# Patient Record
Sex: MALE
[De-identification: names, ages, dates, MRNs, and addresses within clinical notes are randomized per-mention and may not be internally consistent; named-entity substitution may affect disease eponyms.]

---

## 2020-05-28 ENCOUNTER — HOSPITAL ENCOUNTER (INPATIENT)
Dept: HOSPITAL 92 - ERS | Age: 80
LOS: 2 days | Discharge: NURSING, REHAB OR LONG-TERM CARE | DRG: 494 | End: 2020-05-30
Attending: SURGERY | Admitting: SURGERY
Payer: MEDICARE

## 2020-05-28 VITALS — BODY MASS INDEX: 32.8 KG/M2

## 2020-05-28 DIAGNOSIS — N40.0: ICD-10-CM

## 2020-05-28 DIAGNOSIS — Z90.49: ICD-10-CM
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
## 2020-05-28 DIAGNOSIS — I10: ICD-10-CM

## 2020-05-28 DIAGNOSIS — E11.9: ICD-10-CM

## 2020-05-28 DIAGNOSIS — Z79.899: ICD-10-CM
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
## 2020-05-28 DIAGNOSIS — S82.841A: Primary | ICD-10-CM

## 2020-05-28 LAB
ALBUMIN SERPL BCG-MCNC: 4.2 G/DL (ref 3.4–4.8)
ALP SERPL-CCNC: 49 U/L (ref 40–110)
ALT SERPL W P-5'-P-CCNC: 31 U/L (ref 8–55)
ANION GAP SERPL CALC-SCNC: 13 MMOL/L (ref 10–20)
APTT PPP: 31.8 SEC (ref 22.9–36.1)
AST SERPL-CCNC: 23 U/L (ref 5–34)
BASOPHILS # BLD AUTO: 0 THOU/UL (ref 0–0.2)
BASOPHILS NFR BLD AUTO: 0.2 % (ref 0–1)
BILIRUB SERPL-MCNC: 0.6 MG/DL (ref 0.2–1.2)
BUN SERPL-MCNC: 18 MG/DL (ref 8.4–25.7)
CALCIUM SERPL-MCNC: 9 MG/DL (ref 7.8–10.44)
CHLORIDE SERPL-SCNC: 106 MMOL/L (ref 98–107)
CO2 SERPL-SCNC: 22 MMOL/L (ref 23–31)
CREAT CL PREDICTED SERPL C-G-VRATE: 0 ML/MIN (ref 70–130)
EOSINOPHIL # BLD AUTO: 0.2 THOU/UL (ref 0–0.7)
EOSINOPHIL NFR BLD AUTO: 1.7 % (ref 0–10)
GLOBULIN SER CALC-MCNC: 2.4 G/DL (ref 2.4–3.5)
GLUCOSE SERPL-MCNC: 126 MG/DL (ref 83–110)
HGB BLD-MCNC: 14.6 G/DL (ref 14–18)
INR PPP: 1
LYMPHOCYTES # BLD: 1.4 THOU/UL (ref 1.2–3.4)
LYMPHOCYTES NFR BLD AUTO: 12.4 % (ref 21–51)
MCH RBC QN AUTO: 29.8 PG (ref 27–31)
MCV RBC AUTO: 90.2 FL (ref 78–98)
MONOCYTES # BLD AUTO: 0.8 THOU/UL (ref 0.11–0.59)
MONOCYTES NFR BLD AUTO: 6.8 % (ref 0–10)
NEUTROPHILS # BLD AUTO: 9.2 THOU/UL (ref 1.4–6.5)
NEUTROPHILS NFR BLD AUTO: 78.8 % (ref 42–75)
PLATELET # BLD AUTO: 207 THOU/UL (ref 130–400)
POTASSIUM SERPL-SCNC: 4.3 MMOL/L (ref 3.5–5.1)
PROTHROMBIN TIME: 12.8 SEC (ref 12–14.7)
RBC # BLD AUTO: 4.89 MILL/UL (ref 4.7–6.1)
SODIUM SERPL-SCNC: 137 MMOL/L (ref 136–145)
WBC # BLD AUTO: 11.6 THOU/UL (ref 4.8–10.8)

## 2020-05-28 PROCEDURE — 27840 TREAT ANKLE DISLOCATION: CPT

## 2020-05-28 PROCEDURE — 76000 FLUOROSCOPY <1 HR PHYS/QHP: CPT

## 2020-05-28 PROCEDURE — 84100 ASSAY OF PHOSPHORUS: CPT

## 2020-05-28 PROCEDURE — 85730 THROMBOPLASTIN TIME PARTIAL: CPT

## 2020-05-28 PROCEDURE — 86850 RBC ANTIBODY SCREEN: CPT

## 2020-05-28 PROCEDURE — 80048 BASIC METABOLIC PNL TOTAL CA: CPT

## 2020-05-28 PROCEDURE — 36415 COLL VENOUS BLD VENIPUNCTURE: CPT

## 2020-05-28 PROCEDURE — 0QSJ04Z REPOSITION RIGHT FIBULA WITH INTERNAL FIXATION DEVICE, OPEN APPROACH: ICD-10-PCS | Performed by: ORTHOPAEDIC SURGERY

## 2020-05-28 PROCEDURE — 80053 COMPREHEN METABOLIC PANEL: CPT

## 2020-05-28 PROCEDURE — 71045 X-RAY EXAM CHEST 1 VIEW: CPT

## 2020-05-28 PROCEDURE — G0390 TRAUMA RESPONS W/HOSP CRITI: HCPCS

## 2020-05-28 PROCEDURE — 83735 ASSAY OF MAGNESIUM: CPT

## 2020-05-28 PROCEDURE — C1713 ANCHOR/SCREW BN/BN,TIS/BN: HCPCS

## 2020-05-28 PROCEDURE — 82533 TOTAL CORTISOL: CPT

## 2020-05-28 PROCEDURE — 86900 BLOOD TYPING SEROLOGIC ABO: CPT

## 2020-05-28 PROCEDURE — 96374 THER/PROPH/DIAG INJ IV PUSH: CPT

## 2020-05-28 PROCEDURE — 94760 N-INVAS EAR/PLS OXIMETRY 1: CPT

## 2020-05-28 PROCEDURE — 99152 MOD SED SAME PHYS/QHP 5/>YRS: CPT

## 2020-05-28 PROCEDURE — 85025 COMPLETE CBC W/AUTO DIFF WBC: CPT

## 2020-05-28 PROCEDURE — 93005 ELECTROCARDIOGRAM TRACING: CPT

## 2020-05-28 PROCEDURE — 86901 BLOOD TYPING SEROLOGIC RH(D): CPT

## 2020-05-28 PROCEDURE — S0020 INJECTION, BUPIVICAINE HYDRO: HCPCS

## 2020-05-28 PROCEDURE — 85610 PROTHROMBIN TIME: CPT

## 2020-05-28 RX ADMIN — CEFAZOLIN SODIUM SCH MLS: 2 SOLUTION INTRAVENOUS at 21:03

## 2020-05-28 RX ADMIN — DOCUSATE SODIUM 50 MG AND SENNOSIDES 8.6 MG SCH TAB: 8.6; 5 TABLET, FILM COATED ORAL at 21:02

## 2020-05-28 NOTE — RAD
Exam:Right tibia fibula 2 views



HISTORY: Fall. Pain.



COMPARISON: None



FINDINGS: No fracture, cortical irregularity or periosteal reaction in the visualized right tibia and
 proximal to mid fibula. Distal fibular fractures noted.



IMPRESSION: Distal fibula fracture.



Reported By: Constantino Bergman 

Electronically Signed:  5/28/2020 11:55 AM

## 2020-05-28 NOTE — RAD
XR Tib Fib Rt Leg 2 View



INDICATION: 79-year-old male status post reduction



FINDINGS:



Bones: Since the comparison examination there is been interval placement of a fiberglass splint. The 
mildly displaced lateral malleolus fracture is not appreciably changed in position. The known right

medial malleolus fracture is not well seen. The ankle mortise is not well evaluated on the current ex
am



Joints: No acute abnormality.



Soft tissues: No radiopaque foreign body is evident.



IMPRESSION: Interval placement of a fiberglass splint involving the right lower extremity. Visualized
 mildly displaced lateral malleolus fracture is not appreciably changed in position. Recommend a

dedicated radiograph of the right ankle.



Reported By: Edgard Brown 

Electronically Signed:  5/28/2020 12:41 PM

## 2020-05-28 NOTE — HP
REQUESTING PHYSICIAN:  Dr. John Jerry.



ATTENDING SURGEON:  Dr. Nickerson.



CONSULTATIONS:  Orthopedics, Dr. Mireles.



HISTORY OF PRESENT ILLNESS:  The patient is an 79-year-old man, who was walking down

flight of stairs when he missed one of the middle stairs and rolled his right ankle

and fell.  He denied loss of consciousness or hitting his head.  His chief complaint

was right ankle pain.  He was brought by EMS to the emergency department, where he

underwent evaluation and examination, was noted to have a right bimalleolar fracture

dislocation of his distal tibia and fibula.  While in the emergency department, the

patient underwent conscious sedation and had closed reduction and splinting done of

his ankle, at which time we were asked to evaluate the patient for admission and

obtain Orthopedic consultation. 



ALLERGIES:  NONE.



CURRENT MEDICATIONS:  The patient reports he takes high blood pressure medicine and

medication to help with his urinary function. 



PAST MEDICAL HISTORY:  Hypertension, type 2 diabetes, BPH.



PAST SURGICAL HISTORY:  Hernia repair, tonsillectomy, appendectomy, and knee scope,

the patient does not recall which side. 



SOCIAL HISTORY:  The patient lives at home with his spouse in [place].  He drinks

rarely.  Smoked approximately three packs of cigarettes for many years and quit

greater than 10 years ago. 



REVIEW OF SYSTEMS:  A 10-point review of systems is negative as otherwise stated.



PHYSICAL EXAMINATION:

VITAL SIGNS:  Blood pressure 135/77, heart rate 63, respirations 18, oxygen

saturation is 100% on 2 L via nasal cannula, temperature is 98.4. 

GENERAL:  The patient is resting comfortably in bed.  He is awake, conversant,

appropriate.  His [place] Coma Scale is 15 

HEENT:  Head is normocephalic, atraumatic.  Eyes, extraocular motion intact.  PERRLA

bilaterally.  Ears are atraumatic without discharge.  Nose is atraumatic without

discharge.  Oropharynx is clear. 

NECK:  Nontender.  Trachea is midline with no JVD. 

CHEST:  Clear to auscultation with good inspiratory and expiratory effort. 

HEART:  Regular rate and rhythm. 

ABDOMEN:  Soft, flat, nontender with active bowel sounds.  Pelvis is stable. 

EXTREMITIES:  Neurovascularly intact x4.  Right lower extremity is immobilized in an

L and U splint.  He has good capillary refill in the extremity and the skin is warm

and dry. 

BACK:  By report is atraumatic and nontender.



LABORATORY FINDINGS:  White blood cell count 11.6, hemoglobin 14.6, hematocrit 44.2,

platelets 207.  Sodium 137, potassium 4.3, chloride 106, CO2 of 22, BUN 18,

creatinine 0.81, glucose 126.  LFTs are unremarkable.  PT 13, INR 1.0, PTT 32. 



RADIOGRAPHIC REPORTS:  AP chest x-ray shows no acute cardiopulmonary abnormality.

Views of the tibia and fibula along with radiographs of the right ankle show a

fracture dislocation of the tibiotalar articulation with bimalleolar fractures.  The

patient also underwent closed reduction.  Postreduction films show marked

improvement of fracture fragments and the joint. 



ASSESSMENT AND PLAN:  

1. Status post fall downstairs.

2. Right ankle fracture dislocation, subsequent closed reduction and splinting in

the emergency department. 

3. Acute pain secondary to above.

4. History of hypertension.

5. History of BPH and type 2 diabetes.



PLAN:  Plan will be to continue the patient n.p.o.  He has been n.p.o. since this

morning.  When enquiring about blood thinners, the patient reports that he only

takes an aspirin a day.  The patient will be able to undergo surgical intervention

today.  Postoperatively, we will begin pain control, pulmonary toilet, gastritis and

mechanical 

VTE prophylaxis, and begin working with Physical and Occupational Therapy.  We will

also discuss placement at that time.  The evaluation, examination, laboratory, and

radiographic findings were discussed with Dr. Nickerson prior to this dictation.  The

patient was evaluated in the emergency department by Orthopedics. 







Job ID:  [id number]

## 2020-05-28 NOTE — RAD
XR Ankle Rt 3 View STANDARD



INDICATION: Post reduction



COMPARISON: Prior ankle radiograph dated 5/20/2020



FINDINGS:



Bones: There is improved alignment involving the displaced medial malleolus and lateral malleoli are 
ankle fractures.



Ankle mortise: Since the comparison examination there is improved alignment involving the ankle morti
se. There is mild residual posterior lateral subluxation of the talar dome in relationship to the

tibial plafond.



Talar Dome: Intact.



Subtalar joint: Normal.



Visualized hindfoot: Normal.



Periarticular soft tissues: There is been interval placement of an overlying fiberglass splint.



IMPRESSION:

1. Interval reduction of the right ankle fracture dislocation with improved alignment involving the m
edial malleolus and lateral malleoli lower ankle fractures. There is mild residual posterior

lateral subluxation of the talar dome in relationship to the tibial plafond.



Reported By: Edgard Brown 

Electronically Signed:  5/28/2020 12:43 PM

## 2020-05-28 NOTE — RAD
Chest AP view



INDICATION: Preop examination; history of fall



COMPARISON: None



FINDINGS:



Lungs:  The lungs are clear



Cardiac silhouette:  The cardiomediastinal silhouette appears within normal limits.



Pulmonary vasculature:  Normal



Pleural spaces:  No pleural effusion or pneumothorax is demonstrated.



Upper abdomen:  No abnormality seen.



Osseous structures:  No acute osseous abnormality.



Additional findings:  None.



IMPRESSION: 

No acute cardiopulmonary abnormality.



Reported By: Edgard Brown 

Electronically Signed:  5/28/2020 12:44 PM

## 2020-05-28 NOTE — RAD
4 intraoperative views of the right ankle:

5/28/2020



COMPARISON: 5/28/2020



HISTORY: Fracture status post ORIF



FINDINGS: 2 screws traverse the previously noted medial malleolus fracture. Lateral screw and plate f
ixation is seen involving the distal fibula.



IMPRESSION: ORIF as above.



Reported By: Dipak Garcia 

Electronically Signed:  5/28/2020 6:39 PM
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
## 2020-05-28 NOTE — RAD
Exam:Right ankle 2 views



HISTORY: Trauma. Fall. Pain.



COMPARISON: None



FINDINGS: Subluxation of the tibiotalar articulation. Medial malleolus fracture. Comminuted distal fi
bula fracture. Associated deformity and soft tissue swelling.



IMPRESSION: Fracture dislocation as described above.



Reported By: Constantino Bergman 

Electronically Signed:  5/28/2020 11:55 AM

## 2020-05-29 LAB
ANION GAP SERPL CALC-SCNC: 10 MMOL/L (ref 10–20)
BASOPHILS # BLD AUTO: 0 THOU/UL (ref 0–0.2)
BASOPHILS NFR BLD AUTO: 0.4 % (ref 0–1)
BUN SERPL-MCNC: 15 MG/DL (ref 8.4–25.7)
CALCIUM SERPL-MCNC: 8 MG/DL (ref 7.8–10.44)
CHLORIDE SERPL-SCNC: 107 MMOL/L (ref 98–107)
CO2 SERPL-SCNC: 25 MMOL/L (ref 23–31)
CREAT CL PREDICTED SERPL C-G-VRATE: 102 ML/MIN (ref 70–130)
EOSINOPHIL # BLD AUTO: 0.2 THOU/UL (ref 0–0.7)
EOSINOPHIL NFR BLD AUTO: 2.2 % (ref 0–10)
GLUCOSE SERPL-MCNC: 115 MG/DL (ref 83–110)
HGB BLD-MCNC: 13.2 G/DL (ref 14–18)
LYMPHOCYTES # BLD: 1.4 THOU/UL (ref 1.2–3.4)
LYMPHOCYTES NFR BLD AUTO: 15.3 % (ref 21–51)
MAGNESIUM SERPL-MCNC: 2 MG/DL (ref 1.6–2.6)
MCH RBC QN AUTO: 29.7 PG (ref 27–31)
MCV RBC AUTO: 91.8 FL (ref 78–98)
MONOCYTES # BLD AUTO: 1 THOU/UL (ref 0.11–0.59)
MONOCYTES NFR BLD AUTO: 11.1 % (ref 0–10)
NEUTROPHILS # BLD AUTO: 6.4 THOU/UL (ref 1.4–6.5)
NEUTROPHILS NFR BLD AUTO: 71.1 % (ref 42–75)
PLATELET # BLD AUTO: 197 THOU/UL (ref 130–400)
POTASSIUM SERPL-SCNC: 4 MMOL/L (ref 3.5–5.1)
RBC # BLD AUTO: 4.44 MILL/UL (ref 4.7–6.1)
SODIUM SERPL-SCNC: 138 MMOL/L (ref 136–145)
WBC # BLD AUTO: 9 THOU/UL (ref 4.8–10.8)

## 2020-05-29 RX ADMIN — DOCUSATE SODIUM 50 MG AND SENNOSIDES 8.6 MG SCH TAB: 8.6; 5 TABLET, FILM COATED ORAL at 21:00

## 2020-05-29 RX ADMIN — DOCUSATE SODIUM 50 MG AND SENNOSIDES 8.6 MG SCH TAB: 8.6; 5 TABLET, FILM COATED ORAL at 09:29

## 2020-05-29 RX ADMIN — ACETAMINOPHEN AND CODEINE PHOSPHATE PRN TAB: 300; 30 TABLET ORAL at 21:03

## 2020-05-29 RX ADMIN — CEFAZOLIN SODIUM SCH MLS: 2 SOLUTION INTRAVENOUS at 15:20

## 2020-05-29 RX ADMIN — CEFAZOLIN SODIUM SCH MLS: 2 SOLUTION INTRAVENOUS at 05:31

## 2020-05-29 NOTE — OP
DATE OF PROCEDURE:  05/28/2020



PREOPERATIVE DIAGNOSIS:  Right bimalleolar ankle fracture dislocation.



POSTOPERATIVE DIAGNOSIS:  Right bimalleolar ankle fracture dislocation.



PROCEDURE:  Open reduction internal fixation of right bimalleolar ankle.



ANESTHESIA:  General.



ASSISTANT:  Jem Collier PA-C



IMPLANT:  Synthes 2.7 mm variable angle LCP distal fibular plate.



COMPLICATIONS:  None.



DRAINS:  None.



SPECIMEN:  None.



TOURNIQUET TIME:  74 minutes at 300 mmHg.



OUTCOME:  Satisfactory.



INDICATIONS FOR PROCEDURE:  Patient is an 79-year-old gentleman, status post ground

level fall sustaining a bimalleolar ankle fracture.  After discussion with the

patient including risks and benefits, we decided to proceed with open reduction

internal fixation of this ankle.  Informed consent has been obtained.  I believe all

questions have been answered. 



DESCRIPTION OF PROCEDURE:  The patient was brought to the operating room and a

time-out performed followed by induction of general anesthesia. 



Next, the patient was positioned supine on the OR table and a sterile prep and drape

was performed of the right lower extremity. 



Next, the limb was exsanguinated with Esmarch bandage, tourniquet inflated to 300

mmHg.  A lateral incision was made over the lateral malleolus.  After the skin was

sharply incised, dissection was carried down bluntly exposing the fracture.  Minimal

subperiosteal dissection was performed to expose the fracture edges.  There were

three predominant fragments consisted of a very distal fibular piece, a large

posterior butterfly fragment as well as the main shaft of the fibula.  Using bone

tenaculum, the three fragments were reduced to a near anatomic level and held in

place while C-arm imaging was obtained.  Once confirmed that acceptable alignment

had been achieved, two interfragmentary compression screws were applied to hold

three fragments to near anatomic alignment.  Next, a locking variable angle plate

was applied to the lateral cortex of the distal fibula to neutralize the fracture.

A combination of locking screws in the metaphyseal screw was used distally in

addition to 2.7 mm cortical screws proximally.  At the completion of this, AP,

lateral, and mortise views of the ankle were obtained that showed restoration of

more normal-appearing ankle.  A second incision was then made medially overlying the

medial malleolus.  After the skin was sharply incised, dissection was carried down

bluntly exposing the fracture.  The fracture was reduced under direct visualization

and held in place with a bone tenaculum.  Once reduced, __________ mm cancellous

screws were passed from the tip of the medial malleolus across the fracture and into

the distal tibial metaphysis.  The clamp was then removed and final AP, lateral, and

mortise x-rays were obtained of this ankle.  The x-ray showed anatomic alignment.

The two incisions were then irrigated with bulb syringe, closed in layers with 0

Vicryl deep followed by 2-0 Vicryl and staples for the skin.  Xeroform gauze,

Webril, and a posterior and stirrup fiberglass splint was applied to the ankle and

then the tourniquet was let down.  Patient was transferred to recovery room in

stable condition.  There were no complications.  Patient tolerated the procedure

well. 







Job ID:  [id number]

## 2020-05-29 NOTE — PRG
DATE OF SERVICE:  05/29/2020



SUBJECTIVE:  The patient is hospital day #2, postop day #1, status post ground level

fall on some stairs, in which he sustained a right ankle fracture dislocation.

Yesterday, he underwent open reduction and internal fixation of right bimalleolar

ankle fracture dislocation.  He tolerated that procedure well.  This morning, he

reports that his pain is controlled.  His only concern is taking his blood pressure

medicine.  He is tolerating a diet and he has not yet started working with physical

therapy. 



PHYSICAL EXAMINATION:

VITAL SIGNS:  Temperature is 99.9, heart rate 93, blood pressure 109/66,

respirations 14, oxygen saturation 97% on room air. 

GENERAL:  The patient is resting comfortably in bed.  He is awake, alert, oriented,

reports his pain is controlled and he is tolerating a diet. 

HEENT:  Unremarkable. 

LUNGS:  Clear to auscultation bilaterally. 

HEART:  Regular rate and rhythm. 

ABDOMEN:  Soft, nondistended, nontender with active bowel sounds. 

EXTREMITIES:  Neurovascularly intact x4.  Postop splint and dressing are clean, dry,

and intact. 



LABORATORY FINDINGS:  White blood cell count 9.0, hemoglobin 13.2, hematocrit 40.8,

platelets 197.  Sodium 138, potassium 4.0, chloride 107, CO2 of 25, BUN 15,

creatinine 0.82, glucose 115, magnesium 2.0, phosphorus 3.7.  There are no

radiographs reviewed this morning. 



ASSESSMENT:  

1. Status post fall downstairs.

2. Status post open reduction and internal fixation of right ankle fracture

dislocation, bimalleolar fracture. 

3. Acute pain secondary to above, improved.

4. History of hypertension, benign prostatic hyperplasia, and type 2 diabetes.



PLAN:  Plan will be to continue supportive care.  Encourage physical and

occupational therapy.  Encourage incentive spirometry use and await evaluation for

placement.  The patient was seen this morning during rounds with Dr. Nickerson. 







Job ID:  [id number]

## 2020-05-29 NOTE — PDOC.BPN
- Brief Progress Note





DATE OF SERVICE:  05/28/2020





SUBJECTIVE:  Mr. Solorzano remained encouraged in surgical floor, patient just came 

back from 


PACU .  The patient was


seen on round this evening.  The patient reports pain is well controlled.  


he not yet working with Physical Therapy,


Occupational Therapy.  he developed no fever or shortness of breath. 





OBJECTIVE:  GENERAL:  Currently, the patient is lying in bed, with no acute


respiratory distress 


VITAL SIGNS:  stable


LUNGS:  Clear bilaterally. 


HEART:  Regular rate and rhythm. 


ABDOMEN:  Soft, nondistended. 


EXTREMITIES:  Neurovascularly intact x4.  Postop dressing clean, dry, intact.





ASSESSMENT:  


1. Status post ground level fall.


2. Right ankle fracture, status post repair.








PLAN:  Continue supportive care.  Continue pain control.  Continue working with


Physical Therapy and Occupational Therapy, and placement is pending.

## 2020-05-30 VITALS — TEMPERATURE: 97.2 F

## 2020-05-30 VITALS — DIASTOLIC BLOOD PRESSURE: 74 MMHG | SYSTOLIC BLOOD PRESSURE: 120 MMHG

## 2020-05-30 RX ADMIN — DOCUSATE SODIUM 50 MG AND SENNOSIDES 8.6 MG SCH TAB: 8.6; 5 TABLET, FILM COATED ORAL at 08:47

## 2020-05-30 RX ADMIN — ACETAMINOPHEN AND CODEINE PHOSPHATE PRN TAB: 300; 30 TABLET ORAL at 08:47

## 2020-05-30 RX ADMIN — ACETAMINOPHEN AND CODEINE PHOSPHATE PRN TAB: 300; 30 TABLET ORAL at 12:49
